# Patient Record
(demographics unavailable — no encounter records)

---

## 2025-03-02 NOTE — DISCUSSION/SUMMARY
[FreeTextEntry1] : Discussed intensifying lipids but would like to try diet and exercise first. Will repeat labs in 2-3 mo.  [EKG obtained to assist in diagnosis and management of assessed problem(s)] : EKG obtained to assist in diagnosis and management of assessed problem(s)

## 2025-03-02 NOTE — DISCUSSION/SUMMARY
[EKG obtained to assist in diagnosis and management of assessed problem(s)] : EKG obtained to assist in diagnosis and management of assessed problem(s) [FreeTextEntry1] : Discussed intensifying lipids but would like to try diet and exercise first. Will repeat labs in 2-3 mo.